# Patient Record
Sex: MALE | Race: WHITE | NOT HISPANIC OR LATINO
[De-identification: names, ages, dates, MRNs, and addresses within clinical notes are randomized per-mention and may not be internally consistent; named-entity substitution may affect disease eponyms.]

---

## 2023-05-02 PROBLEM — Z00.00 ENCOUNTER FOR PREVENTIVE HEALTH EXAMINATION: Status: ACTIVE | Noted: 2023-05-02

## 2023-05-23 ENCOUNTER — NON-APPOINTMENT (OUTPATIENT)
Age: 73
End: 2023-05-23

## 2023-05-24 ENCOUNTER — RESULT REVIEW (OUTPATIENT)
Age: 73
End: 2023-05-24

## 2023-05-24 ENCOUNTER — APPOINTMENT (OUTPATIENT)
Dept: ORTHOPEDIC SURGERY | Facility: CLINIC | Age: 73
End: 2023-05-24
Payer: SELF-PAY

## 2023-05-24 VITALS — WEIGHT: 235 LBS | HEIGHT: 72.5 IN | BODY MASS INDEX: 31.48 KG/M2

## 2023-05-24 PROCEDURE — 99024 POSTOP FOLLOW-UP VISIT: CPT

## 2023-05-24 NOTE — HISTORY OF PRESENT ILLNESS
[de-identified] : right tka 2 months ago\par was doing well then did a lot at pt and almost had a fall onto it\par has been doing okay since then \par played 9 holes

## 2023-05-24 NOTE — DISCUSSION/SUMMARY
[de-identified] : he just over did it a little at pt but overall is doing great\par we discussed precautions and fu

## 2023-05-24 NOTE — PHYSICAL EXAM
[de-identified] : no effusion synovitis or erythema\par rom 0-130\par stable\par no pain [de-identified] : xray looks good

## 2023-12-06 ENCOUNTER — RESULT REVIEW (OUTPATIENT)
Age: 73
End: 2023-12-06

## 2023-12-06 ENCOUNTER — APPOINTMENT (OUTPATIENT)
Dept: ORTHOPEDIC SURGERY | Facility: CLINIC | Age: 73
End: 2023-12-06
Payer: MEDICARE

## 2023-12-06 VITALS — WEIGHT: 235 LBS | BODY MASS INDEX: 31.48 KG/M2 | HEIGHT: 72.5 IN

## 2023-12-06 DIAGNOSIS — Z96.651 PRESENCE OF RIGHT ARTIFICIAL KNEE JOINT: ICD-10-CM

## 2023-12-06 PROCEDURE — 99213 OFFICE O/P EST LOW 20 MIN: CPT
